# Patient Record
Sex: FEMALE
[De-identification: names, ages, dates, MRNs, and addresses within clinical notes are randomized per-mention and may not be internally consistent; named-entity substitution may affect disease eponyms.]

---

## 2017-09-17 ENCOUNTER — HEALTH MAINTENANCE LETTER (OUTPATIENT)
Age: 32
End: 2017-09-17

## 2018-08-08 ENCOUNTER — OFFICE VISIT (OUTPATIENT)
Dept: FAMILY MEDICINE | Facility: CLINIC | Age: 33
End: 2018-08-08
Payer: COMMERCIAL

## 2018-08-08 VITALS
TEMPERATURE: 98.8 F | SYSTOLIC BLOOD PRESSURE: 136 MMHG | BODY MASS INDEX: 27.77 KG/M2 | RESPIRATION RATE: 16 BRPM | DIASTOLIC BLOOD PRESSURE: 88 MMHG | WEIGHT: 176 LBS | HEART RATE: 108 BPM | OXYGEN SATURATION: 98 %

## 2018-08-08 DIAGNOSIS — N92.1 MENORRHAGIA WITH IRREGULAR CYCLE: Primary | ICD-10-CM

## 2018-08-08 LAB
ERYTHROCYTE [DISTWIDTH] IN BLOOD BY AUTOMATED COUNT: 13.7 % (ref 10–15)
HCT VFR BLD AUTO: 40.2 % (ref 35–47)
HGB BLD-MCNC: 13.1 G/DL (ref 11.7–15.7)
MCH RBC QN AUTO: 30.8 PG (ref 26.5–33)
MCHC RBC AUTO-ENTMCNC: 32.6 G/DL (ref 31.5–36.5)
MCV RBC AUTO: 95 FL (ref 78–100)
PLATELET # BLD AUTO: 336 10E9/L (ref 150–450)
RBC # BLD AUTO: 4.25 10E12/L (ref 3.8–5.2)
WBC # BLD AUTO: 9.8 10E9/L (ref 4–11)

## 2018-08-08 PROCEDURE — 36415 COLL VENOUS BLD VENIPUNCTURE: CPT | Performed by: NURSE PRACTITIONER

## 2018-08-08 PROCEDURE — 99203 OFFICE O/P NEW LOW 30 MIN: CPT | Performed by: NURSE PRACTITIONER

## 2018-08-08 PROCEDURE — 85027 COMPLETE CBC AUTOMATED: CPT | Performed by: NURSE PRACTITIONER

## 2018-08-08 ASSESSMENT — PAIN SCALES - GENERAL: PAINLEVEL: SEVERE PAIN (7)

## 2018-08-08 NOTE — PATIENT INSTRUCTIONS
CBC pending - I will release to [employer] when available  Please schedule US  Further plan pending US  Possible referral to [facility]    At [facility], we strive to deliver an exceptional experience to you, every time we see you.  If you receive a survey in the mail, please send us back your thoughts. We really do value your feedback.    Based on your medical history, these are the current health maintenance/preventive care services that you are due for (some may have been done at this visit.)  Health Maintenance Due   Topic Date Due     PHQ-2 Q1 YR  08/14/1996     HIV SCREEN (SYSTEM ASSIGNED)  08/14/2002     PAP SCREENING Q3 YR (SYSTEM ASSIGNED)  05/01/2015         Suggested websites for health information:  Www.[employer].org : Up to date and easily searchable information on multiple topics.  Www.medlineplus.gov : medication info, interactive tutorials, watch real surgeries online  Www.familydoctor.org : good info from the Academy of Family Physicians  Www.cdc.gov : public health info, travel advisories, epidemics (H1N1)  Www.aap.org : children's health info, normal development, vaccinations  Www.health[place] : [place] dept of health, public health issues in [place], N1N1    Your care team:                            Family Medicine Internal Medicine   MD Nic Cunningham MD Shantel Branch-Fleming, MD Katya Georgiev PA-C Nam Ho, MD Pediatrics   SETH Martinez, LESLIE Arriaza APRN MD Mickie Antoine MD Deborah Mielke, MD Kim Thein, APRN [facility]      Clinic hours: Monday - Thursday 7 am-7 pm; Fridays 7 am-5 pm.   Urgent care: Monday - Friday 11 am-9 pm; Saturday and Sunday 9 am-5 pm.  Pharmacy : Monday -Thursday 8 am-8 pm; Friday 8 am-6 pm; Saturday and Sunday 9 am-5 pm.     Clinic: [phone]   Pharmacy: [phone]

## 2018-08-08 NOTE — LETTER
August 8, 2018      Kali Sandoval  [address]  [place] [address]        To Whom It May Concern:    Kali Sandoval  was seen on 8/8/2018.  Please excuse her until 8/9/2018 due to illness. She will also need to miss another day for additional testing - date to be determined.        Sincerely,        CARLYLE Solis CNP
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
## 2018-08-08 NOTE — MR AVS SNAPSHOT
After Visit Summary   8/8/2018    Kali Sandoval    MRN: [id number]           Patient Information     Date Of Birth          1985        Visit Information        Provider Department      8/8/2018 4:20 PM Sanjuana Ames APRN CNP [facility]        Today's Diagnoses     Menorrhagia with irregular cycle    -  1      Care Instructions    CBC pending - I will release to [employer] when available  Please schedule US  Further plan pending US  Possible referral to [facility]    At [facility], we strive to deliver an exceptional experience to you, every time we see you.  If you receive a survey in the mail, please send us back your thoughts. We really do value your feedback.    Based on your medical history, these are the current health maintenance/preventive care services that you are due for (some may have been done at this visit.)  Health Maintenance Due   Topic Date Due     PHQ-2 Q1 YR  08/14/1996     HIV SCREEN (SYSTEM ASSIGNED)  08/14/2002     PAP SCREENING Q3 YR (SYSTEM ASSIGNED)  05/01/2015         Suggested websites for health information:  Www.[employer] : Up to date and easily searchable information on multiple topics.  Www.medlineplus.gov : medication info, interactive tutorials, watch real surgeries online  Www.familydoctor.org : good info from the Academy of Family Physicians  Www.cdc.gov : public health info, travel advisories, epidemics (H1N1)  Www.aap.org : children's health info, normal development, vaccinations  Www.health.state[place] : [place] dept of health, public health issues in [place], N1N1    Your care team:                            Family Medicine Internal Medicine   MD Nic Cunningham MD Shantel Branch-Fleming, MD Katya Georgiev PA-C Nam Ho, MD Pediatrics   SETH Martinez, MD Mickie Villaseñor CNP, MD Deborah Mielke, MD Kim Thein, CARLYLE [facility]  hours: Monday - Thursday 7 am-7 pm; Fridays 7 am-5 pm.   Urgent care: Monday - Friday 11 am-9 pm; Saturday and Sunday 9 am-5 pm.  Pharmacy : Monday -Thursday 8 am-8 pm; Friday 8 am-6 pm; Saturday and Sunday 9 am-5 pm.     Clinic: [phone]   Pharmacy: [phone]            Follow-ups after your visit        Future tests that were ordered for you today     Open Future Orders        Priority Expected Expires Ordered    US Pelvic Complete w Transvaginal Routine  8/8/2019 8/8/2018            Who to contact     If you have questions or need follow up information about today's clinic visit or your schedule please contact [facility] directly at [phone].  Normal or non-critical lab and imaging results will be communicated to you by MyChart, letter or phone within 4 business days after the clinic has received the results. If you do not hear from us within 7 days, please contact the clinic through Nightingale[employer] or phone. If you have a critical or abnormal lab result, we will notify you by phone as soon as possible.  Submit refill requests through [employer] or call your pharmacy and they will forward the refill request to us. Please allow 3 business days for your refill to be completed.          Additional Information About Your Visit        MyChart Information     [employer] gives you secure access to your electronic health record. If you see a primary care provider, you can also send messages to your care team and make appointments. If you have questions, please call your primary care clinic.  If you do not have a primary care provider, please call [phone] and they will assist you.        Care EveryWhere ID     This is your Care EveryWhere ID. This could be used by other organizations to access your [facility] medical records  [id number]        Your Vitals Were     Pulse Temperature Respirations Last Period Pulse Oximetry Breastfeeding?    108 98.8  F (37.1  C) (Oral) 16 08/05/2018 98% No    BMI  (Body Mass Index)                   27.77 kg/m2            Blood Pressure from Last 3 Encounters:   08/08/18 136/88   08/29/14 110/76   06/11/14 114/75    Weight from Last 3 Encounters:   08/08/18 176 lb (79.8 kg)   08/29/14 152 lb (68.9 kg)   06/11/14 152 lb 4 oz (69.1 kg)              We Performed the Following     CBC with platelets        Primary Care Provider Office Phone # Fax #    Joy Robles PA-C [phone] [address] [place] [address]        Equal Access to Services     [facility]: Hadii aad ku hadasho [place], waaxda luqadaha, qaybta kaalmada adejazmyneyada, eliel spence . So [facility] [phone].    ATENCIÓN: Si habla español, tiene a landry disposición servicios gratuitos de asistencia lingüística. Llame al [phone].    We comply with applicable federal civil rights laws and [place] laws. We do not discriminate on the basis of race, color, national origin, age, disability, sex, sexual orientation, or gender identity.            Thank you!     Thank you for choosing [facility]  for your care. Our goal is always to provide you with excellent care. Hearing back from our patients is one way we can continue to improve our services. Please take a few minutes to complete the written survey that you may receive in the mail after your visit with us. Thank you!             Your Updated Medication List - Protect others around you: Learn how to safely use, store and throw away your medicines at www.disposemymeds.org.          This list is accurate as of 8/8/18  4:53 PM.  Always use your most recent med list.                   Brand Name Dispense Instructions for use Diagnosis    omeprazole 20 MG tablet     60 tablet    Take 1 tablet (20 mg) by mouth 2 times daily Take 30-60 minutes before a meal.    Gastritis       ondansetron 4 MG tablet    ZOFRAN    30 tablet    Take 1 tablet (4 mg) by mouth every 8 hours as needed for nausea     Migraine       propranolol 20 MG tablet    INDERAL    60 tablet    Take 1 tablet (20 mg) by mouth 2 times daily    Migraine       rizatriptan 5 MG tablet    MAXALT    12 tablet    5 mg at onset of headache. May take another 5 mg if H/A recurs in 2 hours. Maximum. 10 mg in 24 hours. Maximum. 2-3 times in a week.    Migraine       topiramate 100 MG tablet    TOPAMAX    180 tablet    Take 1 tablet (100 mg) by mouth 2 times daily    Analgesic rebound headache       TYLENOL PO      Take by mouth as needed

## 2018-08-31 ENCOUNTER — TELEPHONE (OUTPATIENT)
Dept: FAMILY MEDICINE | Facility: CLINIC | Age: 33
End: 2018-08-31

## 2018-08-31 ENCOUNTER — OFFICE VISIT (OUTPATIENT)
Dept: FAMILY MEDICINE | Facility: CLINIC | Age: 33
End: 2018-08-31
Payer: COMMERCIAL

## 2018-08-31 VITALS — SYSTOLIC BLOOD PRESSURE: 120 MMHG | DIASTOLIC BLOOD PRESSURE: 86 MMHG

## 2018-08-31 DIAGNOSIS — R10.30 LOWER ABDOMINAL PAIN: Primary | ICD-10-CM

## 2018-08-31 DIAGNOSIS — Z02.89 ENCOUNTER FOR COMPLETION OF FORM WITH PATIENT: ICD-10-CM

## 2018-08-31 DIAGNOSIS — M54.50 BILATERAL LOW BACK PAIN WITHOUT SCIATICA, UNSPECIFIED CHRONICITY: ICD-10-CM

## 2018-08-31 DIAGNOSIS — N92.0 EXCESSIVE OR FREQUENT MENSTRUATION: ICD-10-CM

## 2018-08-31 PROCEDURE — 99214 OFFICE O/P EST MOD 30 MIN: CPT | Performed by: NURSE PRACTITIONER

## 2018-08-31 ASSESSMENT — PAIN SCALES - GENERAL: PAINLEVEL: EXTREME PAIN (9)

## 2018-08-31 NOTE — TELEPHONE ENCOUNTER
Reason for Call:  Other     Detailed comments: Needs call back bout more concerns.    Phone Number Patient can be reached at: Cell number on file:    Telephone Information:   Mobile [phone]     Best Time: any    Can we leave a detailed message on this number? YES    Call taken on 8/31/2018 at 2:28 PM by Briseida Gardner     WDL

## 2018-08-31 NOTE — MR AVS SNAPSHOT
After Visit Summary   8/31/2018    Kali Sandoval    MRN: [id number]           Patient Information     Date Of Birth          1985        Visit Information        Provider Department      8/31/2018 1:00 PM Sanjuana Ames APRN CNP [facility]        Today's Diagnoses     Lower abdominal pain    -  1    Bilateral low back pain without sciatica, unspecified chronicity        Excessive or frequent menstruation        Encounter for completion of form with patient          Care Instructions    Please schedule US  Forms completed  Tylenol 650 every 6 hours  Ibuprofen 400 mg every 6 hours  Alternate so you're taking something every 3 hours  Further plan pending results of US    At [facility], we strive to deliver an exceptional experience to you, every time we see you.  If you receive a survey in the mail, please send us back your thoughts. We really do value your feedback.    Based on your medical history, these are the current health maintenance/preventive care services that you are due for (some may have been done at this visit.)  Health Maintenance Due   Topic Date Due     PHQ-2 Q1 YR  08/14/1996     HIV SCREEN (SYSTEM ASSIGNED)  08/14/2002     PAP SCREENING Q3 YR (SYSTEM ASSIGNED)  05/01/2015         Suggested websites for health information:  Www.[employer] : Up to date and easily searchable information on multiple topics.  Www.medlineplus.gov : medication info, interactive tutorials, watch real surgeries online  Www.familydoctor.org : good info from the Academy of Family Physicians  Www.cdc.gov : public health info, travel advisories, epidemics (H1N1)  Www.aap.org : children's health info, normal development, vaccinations  Www.health.state.[place] : [place] dept of health, public health issues in [place], N1N1    Your care team:                            Family Medicine Internal Medicine   MD Nic Cunningham MD Shantel Branch-Fleming, MD Katya  Kimmie Boyd MD Pediatrics   SETH Martinez, LESLIE Arriaza APRMD Mickie Arceo CNP, MD Deborah Mielke, MD Kim Thein, APRDEIRDRE NELSON      Clinic hours: Monday - Thursday 7 am-7 pm; Fridays 7 am-5 pm.   Urgent care: Monday - Friday 11 am-9 pm; Saturday and Sunday 9 am-5 pm.  Pharmacy : Monday -Thursday 8 am-8 pm; Friday 8 am-6 pm; Saturday and Sunday 9 am-5 pm.     Clinic: [phone]   Pharmacy: [phone]            Follow-ups after your visit        Who to contact     If you have questions or need follow up information about today's clinic visit or your schedule please contact [facility] directly at [phone].  Normal or non-critical lab and imaging results will be communicated to you by [employer], letter or phone within 4 business days after the clinic has received the results. If you do not hear from us within 7 days, please contact the clinic through [employer] or phone. If you have a critical or abnormal lab result, we will notify you by phone as soon as possible.  Submit refill requests through [employer] or call your pharmacy and they will forward the refill request to us. Please allow 3 business days for your refill to be completed.          Additional Information About Your Visit        [employer] Information     [employer] gives you secure access to your electronic health record. If you see a primary care provider, you can also send messages to your care team and make appointments. If you have questions, please call your primary care clinic.  If you do not have a primary care provider, please call [phone] and they will assist you.        Care EveryWhere ID     This is your Care EveryWhere ID. This could be used by other organizations to access your [facility] medical records  [id number]        Your Vitals Were     Last Period                   08/05/2018            Blood Pressure from Last 3 Encounters:   08/31/18 120/86    08/08/18 136/88   08/29/14 110/76    Weight from Last 3 Encounters:   08/08/18 176 lb (79.8 kg)   08/29/14 152 lb (68.9 kg)   06/11/14 152 lb 4 oz (69.1 kg)              Today, you had the following     No orders found for display       Primary Care Provider Office Phone # Fax #    Joy Jes Robles PA-C [phone] [address] [place] [address]        Equal Access to Services     [facility]: Hadii aad ku hadasho Soomaali, waaxda luqadaha, qaybta kaalmada adeegyada, waxay idiin hayaan praasnth spence . So [facility] [phone].    ATENCIÓN: Si habla español, tiene a landry disposición servicios gratuitos de asistencia lingüística. Llame al [phone].    We comply with applicable federal civil rights laws and [place] laws. We do not discriminate on the basis of race, color, national origin, age, disability, sex, sexual orientation, or gender identity.            Thank you!     Thank you for choosing [facility]  for your care. Our goal is always to provide you with excellent care. Hearing back from our patients is one way we can continue to improve our services. Please take a few minutes to complete the written survey that you may receive in the mail after your visit with us. Thank you!             Your Updated Medication List - Protect others around you: Learn how to safely use, store and throw away your medicines at www.disposemymeds.org.          This list is accurate as of 8/31/18  2:04 PM.  Always use your most recent med list.                   Brand Name Dispense Instructions for use Diagnosis    IBUPROFEN PO           topiramate 100 MG tablet    TOPAMAX    180 tablet    Take 1 tablet (100 mg) by mouth 2 times daily    Analgesic rebound headache       TYLENOL PO      Take by mouth as needed

## 2018-08-31 NOTE — TELEPHONE ENCOUNTER
Reason for Call:  Other Forms    Detailed comments: Pt returning phone call and would like a call back from Sanjuana Ames only to address her FMLA forms for they are due on Monday and she needs them filled out by the provider as soon as possible.  She would like a call back for an updated status so that she may pick them up at the clinic today before closing.     Phone Number Patient can be reached at: Home number on file [phone] (home)    Best Time: anytime    Can we leave a detailed message on this number? YES    Call taken on 8/31/2018 at 3:51 PM by Sai Hunter

## 2018-08-31 NOTE — PATIENT INSTRUCTIONS
Please schedule US  Forms completed  Tylenol 650 every 6 hours  Ibuprofen 400 mg every 6 hours  Alternate so you're taking something every 3 hours  Further plan pending results of US    At [facility], we strive to deliver an exceptional experience to you, every time we see you.  If you receive a survey in the mail, please send us back your thoughts. We really do value your feedback.    Based on your medical history, these are the current health maintenance/preventive care services that you are due for (some may have been done at this visit.)  Health Maintenance Due   Topic Date Due     PHQ-2 Q1 YR  08/14/1996     HIV SCREEN (SYSTEM ASSIGNED)  08/14/2002     PAP SCREENING Q3 YR (SYSTEM ASSIGNED)  05/01/2015         Suggested websites for health information:  Www.[facility].org : Up to date and easily searchable information on multiple topics.  Www.medlineplus.gov : medication info, interactive tutorials, watch real surgeries online  Www.familydoctor.org : good info from the Academy of Family Physicians  Www.cdc.gov : public health info, travel advisories, epidemics (H1N1)  Www.aap.org : children's health info, normal development, vaccinations  Www.health.[facility].[place].us : [place] dept of health, public health issues in [place], N1N1    Your care team:                            Family Medicine Internal Medicine   MD Nic Cunningham MD Shantel Branch-Fleming, MD Katya Georgiev PA-C Nam Ho, MD Pediatrics   SETH Martinez, MD Mickie Villaseñor CNP, MD Deborah Mielke, MD Kim Thein, APRN CNP      Clinic hours: Monday - Thursday 7 am-7 pm; Fridays 7 am-5 pm.   Urgent care: Monday - Friday 11 am-9 pm; Saturday and Sunday 9 am-5 pm.  Pharmacy : Monday -Thursday 8 am-8 pm; Friday 8 am-6 pm; Saturday and Sunday 9 am-5 pm.     Clinic: [phone]   Pharmacy: [phone]

## 2018-08-31 NOTE — TELEPHONE ENCOUNTER
This writer attempted to contact Patient on 08/31/18      Reason for call Questions and left message.      If patient calls back:   Patient contacted by 1st floor [place] Care Team (MA/TC). Inform patient that someone from the team will contact them, document that pt called and route to care team.         Klaudia Roth

## 2018-08-31 NOTE — PROGRESS NOTES
SUBJECTIVE:   Kali Sandoval is a 33 year old female who presents to clinic today for the following health issues:      Severe abdominal pain with period  X 6 months  Other symptoms: back pain, nausea, dizzy, migranes  Therapy tried: Ibuprofen; no relieve    Forms      Needs paperwork filled out for FMLA so she can get US done otherwise they won't give her time off for testing. [employer] currently on strike regarding FMLA/disability so  gave her the attached paperwork - see scanned documents. Paperwork is for below concerns which she was originally seen for on 18.    Was supposed to have pelvic US today but cancelled it due to heavy bleeding. Missed work 3 times since  due to these symptoms. Bad back and low abdominal pain. Nausea in the morning. Taking 2500 mg tylenol for cramps as well as ibuprofen 6 tabs/day. Cramps go away for an hour. Feels like morning sickness every morning.         Problem list and histories reviewed & adjusted, as indicated.  Additional history: as documented    Patient Active Problem List   Diagnosis     Recurrent boils     CARDIOVASCULAR SCREENING; LDL GOAL LESS THAN 160     Migraine     Gastritis     Past Surgical History:   Procedure Laterality Date     ESOPHAGOSCOPY, GASTROSCOPY, DUODENOSCOPY (EGD), COMBINED  3/27/2014    Procedure: COMBINED ESOPHAGOSCOPY, GASTROSCOPY, DUODENOSCOPY (EGD), BIOPSY SINGLE OR MULTIPLE;;  Surgeon: Chong Alva MD;  Location: [facility]     GYN SURGERY             Social History   Substance Use Topics     Smoking status: Former Smoker     Types: Cigarettes     Smokeless tobacco: Never Used     Alcohol use Yes      Comment: 0-1 drinks monthly     Family History   Problem Relation Age of Onset     Diabetes Maternal Grandmother      Alzheimer Disease Maternal Grandmother      Diabetes Paternal Grandmother          Current Outpatient Prescriptions   Medication Sig Dispense Refill     Acetaminophen (TYLENOL  PO) Take by mouth as needed       IBUPROFEN PO        topiramate (TOPAMAX) 100 MG tablet Take 1 tablet (100 mg) by mouth 2 times daily 180 tablet 3     Allergies   Allergen Reactions     No Known Drug Allergy        Reviewed and updated as needed this visit by clinical staff  Tobacco  Allergies  Meds  Problems  Med Hx  Surg Hx  Fam Hx  Soc Hx        Reviewed and updated as needed this visit by Provider  Allergies  Meds  Problems         ROS:  Constitutional, HEENT, cardiovascular, pulmonary, gi and gu systems are negative, except as otherwise noted.    OBJECTIVE:     /86  LMP 08/05/2018  There is no height or weight on file to calculate BMI.  GENERAL: healthy, alert and no distress  PSYCH: mentation appears normal, affect normal/bright    Diagnostic Test Results:  none     ASSESSMENT/PLAN:       ICD-10-CM    1. Lower abdominal pain R10.30    2. Bilateral low back pain without sciatica, unspecified chronicity M54.5    3. Excessive or frequent menstruation N92.0    4. Encounter for completion of form with patient Z02.89      Unchanged symptoms from 8/8. Still hasn't had her US. Further plan pending results of this.    Please schedule US  Forms completed  Tylenol 650 every 6 hours  Ibuprofen 400 mg every 6 hours  Alternate so you're taking something every 3 hours  Further plan pending results of US    See Patient Instructions    The benefits, risks and potential side effects were discussed in detail. Black box warnings discussed as relevant. All patient questions were answered. The patient was instructed to follow up immediately if any adverse reactions develop.    Patient verbalizes understanding and agrees with plan of care. Patient stable for discharge.    Greater than 50% of the 25 min visit was spent on counseling and coordination of care for the above issues.         CARLYLE Solis [facility]

## 2018-08-31 NOTE — TELEPHONE ENCOUNTER
Spoke with patient to fix the problem. Patient did state that she was upset that it wasn't the provider calling her back. And that she was upset about her appointment today. I reassured her that we can fix her paper work for her.     Klaudia Roth MA

## 2018-09-14 ENCOUNTER — RADIANT APPOINTMENT (OUTPATIENT)
Dept: ULTRASOUND IMAGING | Facility: CLINIC | Age: 33
End: 2018-09-14
Attending: NURSE PRACTITIONER
Payer: COMMERCIAL

## 2018-09-14 DIAGNOSIS — N92.1 MENORRHAGIA WITH IRREGULAR CYCLE: ICD-10-CM

## 2018-09-14 PROCEDURE — 76830 TRANSVAGINAL US NON-OB: CPT

## 2018-09-14 PROCEDURE — 76856 US EXAM PELVIC COMPLETE: CPT

## 2018-09-14 NOTE — LETTER
September 18, 2018      Kali Sandoval  [address]  [place] [facility] [address]        Ms. Sandoval,     Your test was normal for you.     Please contact the clinic if you have additional questions or if symptoms persist.  Thank you.     Sincerely,     Lucian Damon     Resulted Orders   US Pelvic Complete w Transvaginal    Narrative    US PELVIC COMPLETE WITH TRANSVAGINAL    9/14/2018 11:10 AM     HISTORY: Pelvic pain. Heavy and painful periods.    TECHNIQUE:  Transvaginal images were performed to better evaluate the  patient's uterus, ovaries and endometrial stripe.    COMPARISON: None.    FINDINGS:  The uterus is measured at 8.5 x 4.1 x 5.5 cm. No fibroids  are evident. The endometrium appears bifurcated, suggesting an arcuate  or septate configuration of the uterus. Endometrial stripe measures  1.2 cm and is otherwise within normal limits for a premenopausal  patient. The right ovary is unremarkable. The left ovary is  unremarkable. No solid adnexal masses are identified. No free pelvic  fluid is present. Color Doppler blood flow is noted within the ovaries  bilaterally.      Impression    IMPRESSION:   1. The endometrial stripe appears bifurcated, suggesting an arcuate or  septate configuration of the uterus. Consider pelvic MRI for further  characterization.  2. Otherwise unremarkable pelvic ultrasound.    FRANKIE WILKINS MD

## 2018-09-17 PROBLEM — Q51.28 CONGENITAL DUPLICATION OF UTERUS: Status: ACTIVE | Noted: 2018-09-17

## 2018-09-17 NOTE — PROGRESS NOTES
Patient w/hx duplicate uterus per chart.  Please send letter if doesn't check mychart.  Santana Damon PA-C    Ms. Sandoval,    Your test was normal for you.    Please contact the clinic if you have additional questions or if symptoms persist.  Thank you.    Sincerely,    Lucian Damon

## 2018-09-24 ENCOUNTER — OFFICE VISIT (OUTPATIENT)
Dept: FAMILY MEDICINE | Facility: CLINIC | Age: 33
End: 2018-09-24
Payer: COMMERCIAL

## 2018-09-24 VITALS
TEMPERATURE: 98.4 F | SYSTOLIC BLOOD PRESSURE: 124 MMHG | HEART RATE: 87 BPM | HEIGHT: 67 IN | RESPIRATION RATE: 16 BRPM | DIASTOLIC BLOOD PRESSURE: 80 MMHG | OXYGEN SATURATION: 97 % | BODY MASS INDEX: 27.94 KG/M2 | WEIGHT: 178 LBS

## 2018-09-24 DIAGNOSIS — N92.0 EXCESSIVE AND FREQUENT MENSTRUATION: Primary | ICD-10-CM

## 2018-09-24 DIAGNOSIS — R10.9 ABDOMINAL CRAMPING: ICD-10-CM

## 2018-09-24 DIAGNOSIS — Z02.89 ENCOUNTER FOR COMPLETION OF FORM WITH PATIENT: ICD-10-CM

## 2018-09-24 PROCEDURE — 99214 OFFICE O/P EST MOD 30 MIN: CPT | Performed by: NURSE PRACTITIONER

## 2018-09-24 ASSESSMENT — PAIN SCALES - GENERAL: PAINLEVEL: EXTREME PAIN (9)

## 2018-09-24 NOTE — PROGRESS NOTES
"  SUBJECTIVE:   Kali Sandoval is a 33 year old female who presents to clinic today for the following health issues:      Ultrasound results    33 year old female presents for follow up of ultrasound results. She reports that her abdominal cramping has continued. She gets her menses twice monthly and it lasts for approximately 9 days each time. Rates pain 9/10 when menses present, including now. She takes ibuprofen 400 mg 3-4 times daily as well as acetaminophen 1000 mg 4-6 times per day. She has already taken it twice today. She had her US done on 9/14 and it showed a bifurcated uterus and was otherwise unremarkable. The radiologist recommended pelvic MRI; however, she has body piercings that she's not willing to remove for the imaging so this cannot be done. She also has paperwork she would like filled out. It is the same paperwork that I filled out on 8/31. She states that she has been going to work throughout all of this and only misses when she has appointments. A little later in the visit she also states she sometimes doesn't go when she has heavy bleeding. She reports that she went to work on Friday. She also reports that she plans to \"go back to work today.\" She is requesting that FMLA/disability paperwork be completed but states she doesn't have FMLA time. She states she needs this paperwork completed to protect her job when she is away. States her  told her this is the correct paperwork because [employer] and [employer] are on strike over the FMLA/disability issue.      Problem list and histories reviewed & adjusted, as indicated.  Additional history: as documented    Patient Active Problem List   Diagnosis     Recurrent boils     CARDIOVASCULAR SCREENING; LDL GOAL LESS THAN 160     Migraine     Gastritis     Congenital duplication of uterus     Past Surgical History:   Procedure Laterality Date     ESOPHAGOSCOPY, GASTROSCOPY, DUODENOSCOPY (EGD), COMBINED  3/27/2014    Procedure: COMBINED " "ESOPHAGOSCOPY, GASTROSCOPY, DUODENOSCOPY (EGD), BIOPSY SINGLE OR MULTIPLE;;  Surgeon: Chong Alva MD;  Location: [facility]     GYN SURGERY             Social History   Substance Use Topics     Smoking status: Former Smoker     Types: Cigarettes     Smokeless tobacco: Never Used     Alcohol use Yes      Comment: 0-1 drinks monthly     Family History   Problem Relation Age of Onset     Diabetes Maternal Grandmother      Alzheimer Disease Maternal Grandmother      Diabetes Paternal Grandmother          Current Outpatient Prescriptions   Medication Sig Dispense Refill     Acetaminophen (TYLENOL PO) Take by mouth as needed       IBUPROFEN PO        topiramate (TOPAMAX) 100 MG tablet Take 1 tablet (100 mg) by mouth 2 times daily 180 tablet 3     Allergies   Allergen Reactions     No Known Drug Allergy        Reviewed and updated as needed this visit by clinical staff  Tobacco  Allergies  Meds  Problems  Med Hx  Surg Hx  Fam Hx  Soc Hx        Reviewed and updated as needed this visit by Provider  Allergies  Meds  Problems         ROS:  Constitutional, HEENT, cardiovascular, pulmonary, gi and gu systems are negative, except as otherwise noted.    OBJECTIVE:     /80 (BP Location: Right arm, Patient Position: Chair, Cuff Size: Adult Regular)  Pulse 87  Temp 98.4  F (36.9  C) (Oral)  Resp 16  Ht 5' 6.75\" (1.695 m)  Wt 178 lb (80.7 kg)  LMP 2018 (Exact Date)  SpO2 97%  Breastfeeding? No  BMI 28.09 kg/m2  Body mass index is 28.09 kg/(m^2).  GENERAL: healthy, alert and no distress   (female): declined  PSYCH: irritable at times     Diagnostic Test Results:  none     ASSESSMENT/PLAN:         ICD-10-CM    1. Excessive and frequent menstruation N92.0 OB/GYN REFERRAL     CANCELED: MR Pelvis (GYN) wo & w Contrast   2. Abdominal cramping R10.9 OB/GYN REFERRAL   3. Encounter for completion of form with patient Z02.89      The paperwork does not appear to be correct for what she is " asking for. She tells me she has been going to work and only misses for appointments and then admits to not going when she has heavy bleeding. She reports that she has been at work most days since 8/8 when I first saw her. Reports that she went to work on Friday. However, the paperwork appears to be for disability/FMLA and she is asking for it to say she is incapactitated continuous periods of time (starting Sept 10 through Oct 12, which is when her appointment with OBGYN is) and she will continue to go to work unless it's for an appointment or severe symptoms. I do not feel that this is the correct paperwork to reflect this. I offered to call the phone number on the paperwork for questions; however, she stated that she did not give me permission to discuss this with [employer]. She has delayed testing (pelvic US was ordered 8/8 and she had it done 9/14) and declines STI testing and pelvic exam which has made it difficult to come to a diagnosis. She continues to voice frustration with lack of diagnosis and improvement of symptoms. I offered referral to OBGYN which she accepted. She would like to see female and we set her up in [place] at the earliest available appointment, Oct. 12 at 10am. The forms were not completed as patient took them and said she would have someone else complete them.    For her symptoms in the interim, I recommended tylenol and ibuprofen as well as warm pack to lower abdomen.      See Patient Instructions    Please follow up with OBGYN for further evaluation  Tylenol 650-1000 mg every 4-6 hours. No more than 4000 mg in 24 hours  Ibuprofen 600 mg every 6 hours. No more than 3200 mg in 24 hours.  You can also try using a warm pack to your lower abdomen    Greater than 50% of the 30 min visit was spent on counseling and coordination of care for the above issues.       CARLYLE Solis [facility]

## 2018-09-24 NOTE — PATIENT INSTRUCTIONS
Please follow up with OBGYN for further evaluation  Tylenol 650-1000 mg every 4-6 hours. No more than 4000 mg in 24 hours  Ibuprofen 600 mg every 6 hours. No more than 3200 mg in 24 hours.  You can also try using a warm pack to your lower abdomen      At [facility], we strive to deliver an exceptional experience to you, every time we see you.  If you receive a survey in the mail, please send us back your thoughts. We really do value your feedback.    Based on your medical history, these are the current health maintenance/preventive care services that you are due for (some may have been done at this visit.)  Health Maintenance Due   Topic Date Due     PHQ-2 Q1 YR  08/14/1996     HIV SCREEN (SYSTEM ASSIGNED)  08/14/2002     PAP SCREENING Q3 YR (SYSTEM ASSIGNED)  05/01/2015     INFLUENZA VACCINE (1) 09/01/2018         Suggested websites for health information:  Www.[employer] : Up to date and easily searchable information on multiple topics.  Www.medlineplus.gov : medication info, interactive tutorials, watch real surgeries online  Www.familydoctor.org : good info from the Academy of Family Physicians  Www.cdc.gov : public health info, travel advisories, epidemics (H1N1)  Www.aap.org : children's health info, normal development, vaccinations  Www.health.[facility].[place].us : [place] dept of health, public health issues in [place], N1N1    Your care team:                            Family Medicine Internal Medicine   MD Nic Cunningham MD Shantel Branch-Fleming, MD Katya Georgiev PA-C Nam Ho, MD Pediatrics   SETH Martinez, LESLIE Arriaza APRN MD Mickie Antoine MD Deborah Mielke, MD Kim Thein, APRN CNP      Clinic hours: Monday - Thursday 7 am-7 pm; Fridays 7 am-5 pm.   Urgent care: Monday - Friday 11 am-9 pm; Saturday and Sunday 9 am-5 pm.  Pharmacy : Monday -Thursday 8 am-8 pm; Friday 8 am-6 pm; Saturday and Sunday 9 am-5 pm.     Clinic:  [phone]   Pharmacy: [phone]

## 2018-09-24 NOTE — MR AVS SNAPSHOT
After Visit Summary   9/24/2018    Kali Sandoval    MRN: [id number]           Patient Information     Date Of Birth          1985        Visit Information        Provider Department      9/24/2018 12:40 PM Sanjuana Ames APRN CNP [facility]        Today's Diagnoses     Excessive and frequent menstruation    -  1    Abdominal cramping        Encounter for completion of form with patient          Care Instructions    Please follow up with OBGYN for further evaluation  Tylenol 650-1000 mg every 4-6 hours. No more than 4000 mg in 24 hours  Ibuprofen 600 mg every 6 hours. No more than 3200 mg in 24 hours.  You can also try using a warm pack to your lower abdomen  Forms completed for work    At [facility], we strive to deliver an exceptional experience to you, every time we see you.  If you receive a survey in the mail, please send us back your thoughts. We really do value your feedback.    Based on your medical history, these are the current health maintenance/preventive care services that you are due for (some may have been done at this visit.)  Health Maintenance Due   Topic Date Due     PHQ-2 Q1 YR  08/14/1996     HIV SCREEN (SYSTEM ASSIGNED)  08/14/2002     PAP SCREENING Q3 YR (SYSTEM ASSIGNED)  05/01/2015     INFLUENZA VACCINE (1) 09/01/2018         Suggested websites for health information:  Www.[employer] : Up to date and easily searchable information on multiple topics.  Www.medlineplus.gov : medication info, interactive tutorials, watch real surgeries online  Www.familydoctor.org : good info from the Academy of Family Physicians  Www.cdc.gov : public health info, travel advisories, epidemics (H1N1)  Www.aap.org : children's health info, normal development, vaccinations  Www.health.state.[place].us : [place] dept of health, public health issues in [place], N1N1    Your care team:                            Family Medicine Internal Medicine   Parth Breen MD  MD Salud Rose MD Katya Georgiev PA-C Nam Ho, MD Pediatrics   SETH Martinez, LESLIE Arriaza APRN CNP   MD Mickie Paulino MD Deborah Mielke, MD Kim Thein, APRN CNP      Clinic hours: Monday - Thursday 7 am-7 pm; Fridays 7 am-5 pm.   Urgent care: Monday - Friday 11 am-9 pm; Saturday and Sunday 9 am-5 pm.  Pharmacy : Monday -Thursday 8 am-8 pm; Friday 8 am-6 pm; Saturday and Sunday 9 am-5 pm.     Clinic: [phone]   Pharmacy: [phone]            Follow-ups after your visit        Additional Services     OB/GYN REFERRAL       Your provider has referred you to:  [facility]: [facility] [phone]   [email or web address]    Please be aware that coverage of these services is subject to the terms and limitations of your health insurance plan.  Call member services at your health plan with any benefit or coverage questions.      Please bring the following with you to your appointment:    (1) Any X-Rays, CTs or MRIs which have been performed.  Contact the facility where they were done to arrange for  prior to your scheduled appointment.   (2) List of current medications   (3) This referral request   (4) Any documents/labs given to you for this referral                  Your next 10 appointments already scheduled     Oct 12, 2018 10:00 AM CDT   Office Visit with Maria Isabel Caputo MD   [facility]    [address] [address] [place] [id number] [phone]           Bring a current list of meds and any records pertaining to this visit. For Physicals, please bring immunization records and any forms needing to be filled out. Please arrive 10 minutes early to complete paperwork.              Who to contact     If you have questions or need follow up information about today's clinic visit or your schedule please contact  "[facility] directly at [phone].  Normal or non-critical lab and imaging results will be communicated to you by MyChart, letter or phone within 4 business days after the clinic has received the results. If you do not hear from us within 7 days, please contact the clinic through [employer] or phone. If you have a critical or abnormal lab result, we will notify you by phone as soon as possible.  Submit refill requests through [employer] or call your pharmacy and they will forward the refill request to us. Please allow 3 business days for your refill to be completed.          Additional Information About Your Visit        [employer] Information     [employer] gives you secure access to your electronic health record. If you see a primary care provider, you can also send messages to your care team and make appointments. If you have questions, please call your primary care clinic.  If you do not have a primary care provider, please call [phone] and they will assist you.        Care EveryWhere ID     This is your Care EveryWhere ID. This could be used by other organizations to access your [facility] medical records  [id number]        Your Vitals Were     Pulse Temperature Respirations Height Last Period Pulse Oximetry    87 98.4  F (36.9  C) (Oral) 16 5' 6.75\" (1.695 m) 09/22/2018 (Exact Date) 97%    Breastfeeding? BMI (Body Mass Index)                No 28.09 kg/m2           Blood Pressure from Last 3 Encounters:   09/24/18 124/80   08/31/18 120/86   08/08/18 136/88    Weight from Last 3 Encounters:   09/24/18 178 lb (80.7 kg)   08/08/18 176 lb (79.8 kg)   08/29/14 152 lb (68.9 kg)              We Performed the Following     OB/GYN REFERRAL        Primary Care Provider Office Phone # Fax #    Joy Robles PA-C [phone] [address] [place] [address]        Equal Access to Services     DONNY MAYA AH: Hadii aad ku hadasho Soomaali, waaxda luqadaha, qaybta kaalmada " eliel solerpiotrjody la'aapillo ah. [facility] [phone].    ATENCIÓN: Si habla laura, tiene a landry disposición servicios gratuitos de asistencia lingüística. Cee al [phone].    We comply with applicable federal civil rights laws and [place] laws. We do not discriminate on the basis of race, color, national origin, age, disability, sex, sexual orientation, or gender identity.            Thank you!     Thank you for choosing [facility]  for your care. Our goal is always to provide you with excellent care. Hearing back from our patients is one way we can continue to improve our services. Please take a few minutes to complete the written survey that you may receive in the mail after your visit with us. Thank you!             Your Updated Medication List - Protect others around you: Learn how to safely use, store and throw away your medicines at www.disposemymeds.org.          This list is accurate as of 9/24/18  1:56 PM.  Always use your most recent med list.                   Brand Name Dispense Instructions for use Diagnosis    IBUPROFEN PO           topiramate 100 MG tablet    TOPAMAX    180 tablet    Take 1 tablet (100 mg) by mouth 2 times daily    Analgesic rebound headache       TYLENOL PO      Take by mouth as needed

## 2018-10-26 ENCOUNTER — TELEPHONE (OUTPATIENT)
Dept: OBGYN | Facility: CLINIC | Age: 33
End: 2018-10-26

## 2018-10-26 NOTE — TELEPHONE ENCOUNTER
Received disability paperwork for patient to Dr. Caputo.  Patient has never been seen by Dr. Caputo.  Patient had scheduled appointment on 10/12 but was a no show.  Faxed paperwork back with this information to [employer] at [phone].

## 2019-07-01 ENCOUNTER — TELEPHONE (OUTPATIENT)
Dept: OBGYN | Facility: OTHER | Age: 34
End: 2019-07-01

## 2019-07-01 NOTE — TELEPHONE ENCOUNTER
Forms received from [employer] asking for disability paperwork completed.  I called [employer] to let the know that patient has never been seen by our OB/GYN department.  She had an appointment in 10/2018 that she did not show up for.  Paperwork shredded.  Maria Isabel Nichols, SOHAN  July 1, 2019 2:49 PM

## 2019-09-26 ENCOUNTER — TELEPHONE (OUTPATIENT)
Dept: OBGYN | Facility: OTHER | Age: 34
End: 2019-09-26

## 2019-09-26 NOTE — TELEPHONE ENCOUNTER
Writer received letter from [employer], writer verified that the letter was fake, Dr. Caputo has never seen this pt nor performed any of the procedures pt stated in her letter.     Writer called Lucian at [employer] and informed him that the May 8 letter was fake as would be the letter from September 6.     Ivana Bui RN on 9/26/2019 at 3:31 PM

## 2019-09-26 NOTE — TELEPHONE ENCOUNTER
[employer] short term disability received two notes from Dr. Caputo for May 8 2019 and September 6 2019. However notes have no header and no actually signature from Provider.       Lucian from [facility], will fax over the letters for writer to identify. Fax number given [phone].    Will call Lucian back once paperwork is received.     Ivana Bui RN on 9/26/2019 at 3:05 PM

## 2019-09-27 NOTE — TELEPHONE ENCOUNTER
I have never seen this patient and the letter in question was not drafted by me nor anyone in my office.  The letter is fraudulent.  Plan to notify risk management

## 2020-02-23 ENCOUNTER — HEALTH MAINTENANCE LETTER (OUTPATIENT)
Age: 35
End: 2020-02-23

## 2020-12-06 ENCOUNTER — HEALTH MAINTENANCE LETTER (OUTPATIENT)
Age: 35
End: 2020-12-06

## 2021-04-11 ENCOUNTER — HEALTH MAINTENANCE LETTER (OUTPATIENT)
Age: 36
End: 2021-04-11

## 2021-09-25 ENCOUNTER — HEALTH MAINTENANCE LETTER (OUTPATIENT)
Age: 36
End: 2021-09-25

## 2022-05-07 ENCOUNTER — HEALTH MAINTENANCE LETTER (OUTPATIENT)
Age: 37
End: 2022-05-07

## 2023-04-22 ENCOUNTER — HEALTH MAINTENANCE LETTER (OUTPATIENT)
Age: 38
End: 2023-04-22

## 2023-06-02 ENCOUNTER — HEALTH MAINTENANCE LETTER (OUTPATIENT)
Age: 38
End: 2023-06-02